# Patient Record
Sex: FEMALE | Race: OTHER | NOT HISPANIC OR LATINO | URBAN - METROPOLITAN AREA
[De-identification: names, ages, dates, MRNs, and addresses within clinical notes are randomized per-mention and may not be internally consistent; named-entity substitution may affect disease eponyms.]

---

## 2022-09-23 ENCOUNTER — EMERGENCY (EMERGENCY)
Facility: HOSPITAL | Age: 27
LOS: 1 days | Discharge: HOME | End: 2022-09-23
Attending: STUDENT IN AN ORGANIZED HEALTH CARE EDUCATION/TRAINING PROGRAM | Admitting: STUDENT IN AN ORGANIZED HEALTH CARE EDUCATION/TRAINING PROGRAM

## 2022-09-23 VITALS
OXYGEN SATURATION: 100 % | SYSTOLIC BLOOD PRESSURE: 117 MMHG | HEART RATE: 94 BPM | TEMPERATURE: 98 F | RESPIRATION RATE: 15 BRPM | DIASTOLIC BLOOD PRESSURE: 56 MMHG

## 2022-09-23 PROCEDURE — 99284 EMERGENCY DEPT VISIT MOD MDM: CPT

## 2022-09-23 NOTE — ED ADULT TRIAGE NOTE - CHIEF COMPLAINT QUOTE
awake weak c/o left flank pain sore throat and fever x 2 days up to 103 has  been vomiting and unable to eat    no PMHx  temp 98 in triage  took tylenol at 2030

## 2022-09-24 VITALS
SYSTOLIC BLOOD PRESSURE: 108 MMHG | OXYGEN SATURATION: 100 % | TEMPERATURE: 98 F | RESPIRATION RATE: 17 BRPM | HEART RATE: 68 BPM | DIASTOLIC BLOOD PRESSURE: 58 MMHG

## 2022-09-24 LAB
ALBUMIN SERPL ELPH-MCNC: 4.9 G/DL — SIGNIFICANT CHANGE UP (ref 3.3–5)
ALP SERPL-CCNC: 71 U/L — SIGNIFICANT CHANGE UP (ref 40–120)
ALT FLD-CCNC: 13 U/L — SIGNIFICANT CHANGE UP (ref 4–33)
ANION GAP SERPL CALC-SCNC: 12 MMOL/L — SIGNIFICANT CHANGE UP (ref 7–14)
APPEARANCE UR: CLEAR — SIGNIFICANT CHANGE UP
AST SERPL-CCNC: 27 U/L — SIGNIFICANT CHANGE UP (ref 4–32)
B PERT DNA SPEC QL NAA+PROBE: SIGNIFICANT CHANGE UP
B PERT+PARAPERT DNA PNL SPEC NAA+PROBE: SIGNIFICANT CHANGE UP
BACTERIA # UR AUTO: NEGATIVE — SIGNIFICANT CHANGE UP
BASOPHILS # BLD AUTO: 0.01 K/UL — SIGNIFICANT CHANGE UP (ref 0–0.2)
BASOPHILS NFR BLD AUTO: 0.3 % — SIGNIFICANT CHANGE UP (ref 0–2)
BILIRUB SERPL-MCNC: 0.7 MG/DL — SIGNIFICANT CHANGE UP (ref 0.2–1.2)
BILIRUB UR-MCNC: NEGATIVE — SIGNIFICANT CHANGE UP
BORDETELLA PARAPERTUSSIS (RAPRVP): SIGNIFICANT CHANGE UP
BUN SERPL-MCNC: 9 MG/DL — SIGNIFICANT CHANGE UP (ref 7–23)
C PNEUM DNA SPEC QL NAA+PROBE: SIGNIFICANT CHANGE UP
CALCIUM SERPL-MCNC: 8.8 MG/DL — SIGNIFICANT CHANGE UP (ref 8.4–10.5)
CHLORIDE SERPL-SCNC: 105 MMOL/L — SIGNIFICANT CHANGE UP (ref 98–107)
CO2 SERPL-SCNC: 23 MMOL/L — SIGNIFICANT CHANGE UP (ref 22–31)
COLOR SPEC: YELLOW — SIGNIFICANT CHANGE UP
CREAT SERPL-MCNC: 0.72 MG/DL — SIGNIFICANT CHANGE UP (ref 0.5–1.3)
DIFF PNL FLD: ABNORMAL
EGFR: 117 ML/MIN/1.73M2 — SIGNIFICANT CHANGE UP
EOSINOPHIL # BLD AUTO: 0 K/UL — SIGNIFICANT CHANGE UP (ref 0–0.5)
EOSINOPHIL NFR BLD AUTO: 0 % — SIGNIFICANT CHANGE UP (ref 0–6)
EPI CELLS # UR: 2 /HPF — SIGNIFICANT CHANGE UP (ref 0–5)
FLUAV SUBTYP SPEC NAA+PROBE: SIGNIFICANT CHANGE UP
FLUBV RNA SPEC QL NAA+PROBE: SIGNIFICANT CHANGE UP
GLUCOSE SERPL-MCNC: 91 MG/DL — SIGNIFICANT CHANGE UP (ref 70–99)
GLUCOSE UR QL: NEGATIVE — SIGNIFICANT CHANGE UP
HADV DNA SPEC QL NAA+PROBE: SIGNIFICANT CHANGE UP
HCG SERPL-ACNC: <5 MIU/ML — SIGNIFICANT CHANGE UP
HCOV 229E RNA SPEC QL NAA+PROBE: SIGNIFICANT CHANGE UP
HCOV HKU1 RNA SPEC QL NAA+PROBE: SIGNIFICANT CHANGE UP
HCOV NL63 RNA SPEC QL NAA+PROBE: SIGNIFICANT CHANGE UP
HCOV OC43 RNA SPEC QL NAA+PROBE: SIGNIFICANT CHANGE UP
HCT VFR BLD CALC: 41.5 % — SIGNIFICANT CHANGE UP (ref 34.5–45)
HGB BLD-MCNC: 13.4 G/DL — SIGNIFICANT CHANGE UP (ref 11.5–15.5)
HIV 1+2 AB+HIV1 P24 AG SERPL QL IA: SIGNIFICANT CHANGE UP
HMPV RNA SPEC QL NAA+PROBE: SIGNIFICANT CHANGE UP
HPIV1 RNA SPEC QL NAA+PROBE: SIGNIFICANT CHANGE UP
HPIV2 RNA SPEC QL NAA+PROBE: SIGNIFICANT CHANGE UP
HPIV3 RNA SPEC QL NAA+PROBE: SIGNIFICANT CHANGE UP
HPIV4 RNA SPEC QL NAA+PROBE: SIGNIFICANT CHANGE UP
HYALINE CASTS # UR AUTO: 1 /LPF — SIGNIFICANT CHANGE UP (ref 0–7)
IANC: 1.54 K/UL — LOW (ref 1.8–7.4)
IMM GRANULOCYTES NFR BLD AUTO: 0 % — SIGNIFICANT CHANGE UP (ref 0–0.9)
KETONES UR-MCNC: ABNORMAL
LEUKOCYTE ESTERASE UR-ACNC: NEGATIVE — SIGNIFICANT CHANGE UP
LYMPHOCYTES # BLD AUTO: 1.27 K/UL — SIGNIFICANT CHANGE UP (ref 1–3.3)
LYMPHOCYTES # BLD AUTO: 37 % — SIGNIFICANT CHANGE UP (ref 13–44)
M PNEUMO DNA SPEC QL NAA+PROBE: SIGNIFICANT CHANGE UP
MCHC RBC-ENTMCNC: 26 PG — LOW (ref 27–34)
MCHC RBC-ENTMCNC: 32.3 GM/DL — SIGNIFICANT CHANGE UP (ref 32–36)
MCV RBC AUTO: 80.4 FL — SIGNIFICANT CHANGE UP (ref 80–100)
MONOCYTES # BLD AUTO: 0.61 K/UL — SIGNIFICANT CHANGE UP (ref 0–0.9)
MONOCYTES NFR BLD AUTO: 17.8 % — HIGH (ref 2–14)
NEUTROPHILS # BLD AUTO: 1.54 K/UL — LOW (ref 1.8–7.4)
NEUTROPHILS NFR BLD AUTO: 44.9 % — SIGNIFICANT CHANGE UP (ref 43–77)
NITRITE UR-MCNC: NEGATIVE — SIGNIFICANT CHANGE UP
NRBC # BLD: 0 /100 WBCS — SIGNIFICANT CHANGE UP (ref 0–0)
NRBC # FLD: 0 K/UL — SIGNIFICANT CHANGE UP (ref 0–0)
PH UR: 6 — SIGNIFICANT CHANGE UP (ref 5–8)
PLATELET # BLD AUTO: 229 K/UL — SIGNIFICANT CHANGE UP (ref 150–400)
POTASSIUM SERPL-MCNC: 3.3 MMOL/L — LOW (ref 3.5–5.3)
POTASSIUM SERPL-SCNC: 3.3 MMOL/L — LOW (ref 3.5–5.3)
PROT SERPL-MCNC: 8.2 G/DL — SIGNIFICANT CHANGE UP (ref 6–8.3)
PROT UR-MCNC: ABNORMAL
RAPID RVP RESULT: DETECTED
RBC # BLD: 5.16 M/UL — SIGNIFICANT CHANGE UP (ref 3.8–5.2)
RBC # FLD: 14.3 % — SIGNIFICANT CHANGE UP (ref 10.3–14.5)
RBC CASTS # UR COMP ASSIST: SIGNIFICANT CHANGE UP /HPF (ref 0–4)
RSV RNA SPEC QL NAA+PROBE: SIGNIFICANT CHANGE UP
RV+EV RNA SPEC QL NAA+PROBE: SIGNIFICANT CHANGE UP
SARS-COV-2 RNA SPEC QL NAA+PROBE: DETECTED
SODIUM SERPL-SCNC: 140 MMOL/L — SIGNIFICANT CHANGE UP (ref 135–145)
SP GR SPEC: 1.03 — SIGNIFICANT CHANGE UP (ref 1.01–1.05)
UROBILINOGEN FLD QL: SIGNIFICANT CHANGE UP
WBC # BLD: 3.43 K/UL — LOW (ref 3.8–10.5)
WBC # FLD AUTO: 3.43 K/UL — LOW (ref 3.8–10.5)
WBC UR QL: 3 /HPF — SIGNIFICANT CHANGE UP (ref 0–5)

## 2022-09-24 PROCEDURE — 71045 X-RAY EXAM CHEST 1 VIEW: CPT | Mod: 26

## 2022-09-24 RX ORDER — KETOROLAC TROMETHAMINE 30 MG/ML
15 SYRINGE (ML) INJECTION ONCE
Refills: 0 | Status: DISCONTINUED | OUTPATIENT
Start: 2022-09-24 | End: 2022-09-24

## 2022-09-24 RX ORDER — ONDANSETRON 8 MG/1
4 TABLET, FILM COATED ORAL ONCE
Refills: 0 | Status: COMPLETED | OUTPATIENT
Start: 2022-09-24 | End: 2022-09-24

## 2022-09-24 RX ORDER — POTASSIUM CHLORIDE 20 MEQ
40 PACKET (EA) ORAL ONCE
Refills: 0 | Status: COMPLETED | OUTPATIENT
Start: 2022-09-24 | End: 2022-09-24

## 2022-09-24 RX ORDER — ACETAMINOPHEN 500 MG
975 TABLET ORAL ONCE
Refills: 0 | Status: DISCONTINUED | OUTPATIENT
Start: 2022-09-24 | End: 2022-09-24

## 2022-09-24 RX ORDER — IBUPROFEN 200 MG
600 TABLET ORAL ONCE
Refills: 0 | Status: DISCONTINUED | OUTPATIENT
Start: 2022-09-24 | End: 2022-09-24

## 2022-09-24 RX ORDER — SODIUM CHLORIDE 9 MG/ML
1000 INJECTION INTRAMUSCULAR; INTRAVENOUS; SUBCUTANEOUS ONCE
Refills: 0 | Status: COMPLETED | OUTPATIENT
Start: 2022-09-24 | End: 2022-09-24

## 2022-09-24 RX ORDER — DEXAMETHASONE 0.5 MG/5ML
10 ELIXIR ORAL ONCE
Refills: 0 | Status: COMPLETED | OUTPATIENT
Start: 2022-09-24 | End: 2022-09-24

## 2022-09-24 RX ADMIN — Medication 15 MILLIGRAM(S): at 03:44

## 2022-09-24 RX ADMIN — ONDANSETRON 4 MILLIGRAM(S): 8 TABLET, FILM COATED ORAL at 03:44

## 2022-09-24 RX ADMIN — Medication 10 MILLIGRAM(S): at 06:48

## 2022-09-24 RX ADMIN — Medication 40 MILLIEQUIVALENT(S): at 06:55

## 2022-09-24 RX ADMIN — Medication 15 MILLIGRAM(S): at 04:45

## 2022-09-24 RX ADMIN — SODIUM CHLORIDE 1000 MILLILITER(S): 9 INJECTION INTRAMUSCULAR; INTRAVENOUS; SUBCUTANEOUS at 03:03

## 2022-09-24 NOTE — ED ADULT NURSE NOTE - OBJECTIVE STATEMENT
received pt in room 9. A&Ox4, ambulatory at baseline, c/o throat pain, weakness, fever x3 days. States decreased PO intake x2 days, only sips water. Endorses difficulty with swallowing with numbness and tingling in head and face. No drooling noted. Airway patent. Satting 100% on room air. States took amoxicillin today from husbands prescription. no recent dental procedures, Endorses vomiting, has not eaten x2 days, spitting into bag. reports flank pain bilaterally, greater on right. mild abdominal pain. endorses burning with urination, currently on menstrual period. Denies CP, SOB, diarrhea, headache, dizziness, chills, exposure to sick, bowel/bladder changes. Vss. Rr even and unlabored. Awaiting further orders from provider.

## 2022-09-24 NOTE — ED PROVIDER NOTE - PHYSICAL EXAMINATION
Marquez PARKER:  VITALS: Initial triage and subsequent vitals have been reviewed by me.  GEN APPEARANCE: WDWN, alert and cooperative, non-toxic appearing and in NAD  HEAD: Atraumatic, normocephalic   EYES: PERRLa, EOMI, vision grossly intact.   EARS: Gross hearing intact.   NOSE: No nasal discharge, no external evidence of epistaxis.   THROAT: MMM. Oral cavity and pharynx normal. mild oropharynx inflammation, no swelling, no exudate, no oral lesions.  NECK: Supple  CV: RRR, S1S2, no c/r/m/g. No cyanosis. Extremities warm, well perfused. Cap refill <2 seconds. No bruits.   LUNGS: CTAB. No wheezing. No rales. No rhonchi. No diminished breath sounds.   ABDOMEN: Soft, NTND. No guarding or rebound. No masses.   MSK/EXT: Spine appears normal, no spine point tenderness. No CVA ttp. Normal muscular development. Pelvis stable. No obvious joint or bony deformity, no peripheral edema.   NEURO: Alert, follows commands. Weight bearing normal. Speech normal. Sensation and motor normal x4 extremities.   SKIN: Normal color for race, warm, dry and intact. No evidence of rash.  PSYCH: Normal mood and affect. Marquez PARKER:  VITALS: Initial triage and subsequent vitals have been reviewed by me.  GEN APPEARANCE: WDWN, alert and cooperative, non-toxic appearing and in NAD  HEAD: Atraumatic, normocephalic mild b/l cervical adenopathy  EYES: PERRLa, EOMI, vision grossly intact.   EARS: Gross hearing intact.   NOSE: No nasal discharge, no external evidence of epistaxis.   THROAT: MMM. Oral cavity and pharynx normal. mild oropharynx inflammation, no swelling, no exudate, no oral lesions.  NECK: Supple  CV: RRR, S1S2, no c/r/m/g. No cyanosis. Extremities warm, well perfused. Cap refill <2 seconds. No bruits.   LUNGS: CTAB. No wheezing. No rales. No rhonchi. No diminished breath sounds.   ABDOMEN: Soft, NTND. No guarding or rebound. No masses.   MSK/EXT: Spine appears normal, no spine point tenderness. No CVA ttp. Normal muscular development. Pelvis stable. No obvious joint or bony deformity, no peripheral edema.   NEURO: Alert, follows commands. Weight bearing normal. Speech normal. Sensation and motor normal x4 extremities.   SKIN: Normal color for race, warm, dry and intact. No evidence of rash.  PSYCH: Normal mood and affect.

## 2022-09-24 NOTE — ED ADULT NURSE REASSESSMENT NOTE - NS ED NURSE REASSESS COMMENT FT1
Received report from day RN. A&Ox4, ambulatory at baseline. Resting comfortably, offers no complaints. Denies CP, SOB, N+V, diarrhea, headache, dizzniness, chills. 20g IV in place to left AC, no redness or swelling noted, flushes well. Awaiting further orders from provider. Safety measures in place Received report from noel  RN. A&Ox4, ambulatory at baseline. Resting comfortably, offers no complaints. Denies CP, SOB, N+V, diarrhea, headache, dizzniness, chills. 20g IV in place to left AC, no redness or swelling noted, flushes well. Awaiting further orders from provider. Safety measures in place

## 2022-09-24 NOTE — ED PROVIDER NOTE - NSFOLLOWUPINSTRUCTIONS_ED_ALL_ED_FT
(1) Follow up with your primary care physician as discussed.  (2) Immediately seek care at your nearest emergency room if your symptoms worsen, persist, or do not resolve   (3) Take Tylenol and/or Motrin as needed for pain per the dosing instructions on the bottle.            COVID-19 (Enfermedad por coronavirus 2019)    LO QUE NECESITA SABER:    La COVID-19 es la enfermedad causada por el nuevo coronavirus descubierto por primera vez en diciembre de 2019. Los coronavirus generalmente causan infecciones de las vías respiratorias superiores (nariz, garganta y pulmones), vane un resfriado. El virus de 2019 se propaga rápida y fácilmente. Puede transmitirse a partir de 2 a 3 días antes de que comiencen los síntomas.    INSTRUCCIONES SOBRE EL CONNIE HOSPITALARIA:    Llame al número de emergencias local (911 en los [place]) si:  •Usted tiene dificultad para respirar o falta de aliento mientras descansa.      •Usted siente presión o dolor en el pecho que dura más de 5 minutos.      •Usted tiene confusión o es difícil despertarlo.      •Marisa labios o christian están azules.      Regrese a la jevon de emergencias si:  •Usted tiene fiebre de 104 °F (40 °C) o más.          Llame a rodrigues médico si:  •Usted tiene síntomas de COVID-19.      •Usted tiene preguntas o inquietudes acerca de rodrigues condición o cuidado.      Medicamentos:Es posible que usted necesite alguno de los siguientes:  •Los descongestivosayudan a reducir la congestión nasal y ayudan a respirar más fácilmente. Si nazia pastillas descongestivas, pueden causarle agitación o problemas para dormir. No use aerosol descongestionante por más de unos cuantos días.      •Los jarabes para la tosayudan a reducir la tos. Pregúntele a rodrigues médico cuál tipo de medicamento para la tos es mejor para usted.      •Acetaminofénalivia el dolor y baja la fiebre. Está disponible sin receta médica. Pregunte la cantidad y la frecuencia con que debe tomarlos. Siga las indicaciones. Lara las etiquetas de todos los demás medicamentos que esté usando para saber si también contienen acetaminofén, o pregunte a rodrigues médico o farmacéutico. El acetaminofén puede causar daño en el hígado cuando no se nazia de forma correcta.      •AINEcomo el ibuprofeno, ayudan a disminuir la inflamación, el dolor y la fiebre. Natalie medicamento está disponible con o sin sharath receta médica. Los CARINA pueden causar sangrado estomacal o problemas renales en ciertas personas. Si usted nazia un medicamento anticoagulante, siempre pregúntele a rodrigues médico si los CARINA son seguros para usted. Siempre lara la etiqueta de natalie medicamento y siga las instrucciones.      •Los anticoagulantes ayudan a evitar los coágulos sanguíneos. Los coágulos pueden ocasionar derrames cerebrales, ataques al corazón y hasta la muerte. Las siguientes son pautas generales de seguridad para seguir mientras está tomando un anticoagulante:?Esté atento a sangrados y hematomas mientras esté tomando anticoagulantes. Esté atento a cualquier sangrado de las encías o nariz. Esté atento a la aparición de tariq en rodrigues orina y evacuaciones intestinales. Use sharath toalla suave para rodrigues piel y un cepillo de dientes de cerdas suaves para cepillarse marisa dientes. [place] puede evitar que rodrigues piel o encías sangren. Si usted se afeita, use sharath rasuradora eléctrica. No practique deportes de contacto.      ?Informe a rodrigues odontólogo y otros médicos que usted nazia anticoagulantes. Lleve un brazalete o un collar que indique que usted nazia natalie medicamento.      ?No empiece ni suspenda ningún medicamento, a menos que rodrigues médico se lo indique. Muchos medicamentos no se pueden usar junto con los anticoagulantes.      ?[place] el anticoagulante exactamente vane se lo ordenó rodrigues médico. No omita ninguna dosis ni tome menos de lo indicado. Informe a rodrigues médico inmediatamente si usted olvida annette el anticoagulante o si nazia de más.      ?La warfarina es un anticoagulante que usted puede necesitar annette. Usted debería saber lo siguiente en sav de que tome warfarina: ?Algunos alimentos y medicamentos pueden afectar la cantidad de warfarina en rodrigues tariq. No realice cambios mayores en rodrigues alimentación mientras nazia warfarina. La warfarina funciona mejor si usted ingiere aproximadamente la misma cantidad de vitamina K todos los días. La vitamina K se encuentra en las hortalizas de hoja jasvir y algunos otros alimentos. Solicite más información acerca de lo que tiene que comer cuando usted nazia warfarina.      ?Usted necesitará acudir a consultas de control con rodrigues médico cuando esté tomando warfarina. Deberá hacerse análisis de tariq periódicamente. Estos análisis se usan para determinar la cantidad de medicamento que necesita.        •[place] marisa medicamentos vane se le haya indicado.Consulte con rodrigues médico si usted chet que rodrigues medicamento no le está ayudando o si presenta efectos secundarios. Infórmele al médico si usted es alérgico a algún medicamento. Mantenga sharath lista actualizada de los medicamentos, las vitaminas y los productos herbales que nazia. Incluya los siguientes datos de los medicamentos: cantidad, frecuencia y motivo de administración. Traiga con usted la lista o los envases de las píldoras a marisa citas de seguimiento. Lleve la lista de los medicamentos con usted en sav de sharath emergencia.      Lo que necesita saber acerca de las variantes:El virus ha cambiado en varias formas nuevas (llamadas variantes) desde que se descubrió. Las variantes pueden ser más contagiosas (se propagan fácilmente) que la forma original. Además, algunas pueden causar sharath enfermedad más grave que otras.    Lo que necesita saber acerca de las vacunas contra la COVID-19:Los médicos recomiendan la vacuna contra la COVID-19, incluso si ya tuvo COVID-19. Se considera que usted está completamente vacunado contra la COVID-19 dos semanas después de la última dosis de cualquier vacuna contra COVID-19. Informe a rodrigues médico cuando haya recibido la última dosis de la vacuna. Tj sharath copia de rodrigues tarjeta de vacunación. Conserve el original en sva de que tenga que mostrarlo. Mantenga la copia en un lugar seguro.  •Reciba la vacuna contra la COVID-19 según las indicaciones.Se recomienda la vacunación a todas las personas de 6 meses o más. Las vacunas contra la COVID-19 se administran en forma de inyección en 1 a 3 dosis primarias. [place] depende de la angie de la vacuna y de la edad de la persona que la recibe. Se recomienda sharath dosis de refuerzo para todas las personas de 5 años o más después de completar la serie primaria. Se recomienda sharath segunda dosis refuerzo para todos los adultos de 50 años o más y para los adolescentes inmunodeprimidos. La segunda dosis de refuerzo también se recomienda para todos los que recibieron la angie de la vacuna de 1 dosis vane la primera dosis y un refuerzo. Rodrigues médico puede darle más información sobre los refuerzos y ayudarlo a programar todas las dosis necesarias.  Calendario de vacunación recomendado contra la COVID-19           •Continúe con el distanciamiento social y otras medidas.Puede contagiarse incluso después de recibir la vacuna. También puede transmitir el virus a otras personas sin saber que tiene la infección.      •Después de vacunarse, compruebe las normas de viaje locales, nacionales e internacionales.Es posible que tenga que hacerse la prueba antes de viajar. Algunos países exigen evidencia de que la prueba es negativa antes de viajar. Alexis vez también sea necesario hacer cuarentena tras rodrigues regreso.      •Se pueden administrar medicamentos para evitar sharath infección.Los medicamentos pueden administrarse si tiene un alto riesgo de infección y no puede recibir la vacuna. También pueden administrarse si rodrigues sistema inmunitario no responde bailey a la vacuna.      Cómo se propaga el coronavirus 2019:  •Las gotitas son la principal forma de propagación de todos los coronavirus.El virus viaja en las gotitas que se marcela cuando sharath persona habla, canta, tose o estornuda. Las gotitas también pueden flotar en el aire por minutos u horas. La infección se produce cuando respira las gotitas o cuando le tocan los ojos o la nariz. El contacto personal cercano con sharath persona infectada aumenta el riesgo de infección. [place] significa estar a menos de 6 pies (2 metros) de distancia de la persona josefina al menos 15 minutos en 24 horas.      •El contacto de persona a persona puede propagar el virus.Por ejemplo, sharath persona con el virus en marisa eleazar puede propagarlo al darle la mano a alguien.      •El virus puede permanecer en objetos y superficies hasta 3 días.Puede infectarse si toca el objeto o la superficie y luego se toca los ojos o la boca.      Ayude a reducir el riesgo de COVID-19:  •Lávese las eleazar con frecuencia josefina el día.Utilice agua y jabón. Frótese las eleazar enjabonadas, entrelazando los dedos, josefina al menos 20 segundos. Enjuáguese con agua corriente caliente. Séquese las eleazar con sharath toalla limpia o sharath toalla de papel. Puede usar un desinfectante para eleazar que contenga alcohol, si no hay agua y jabón disponibles. Enseñe a los niños a lavarse las eleazar y a usar el desinfectante de eleazar.  Lavado de eleazar           •Cúbrase al toser y estornudar.Gire la christian y cúbrase la boca y la nariz con un pañuelo. Deseche el pañuelo. Use el ángulo del brazo si no tiene un pañuelo disponible. Luego lávese las eleazar con agua y jabón o use un desinfectante de eleazar. Enséñeles a los niños a cubrirse al toser o estornudar.      •Use un tapabocas (máscara) cuando sea necesario.Utilice un tapabocas de kelton con al menos 2 capas. También puede crear capas colocando un tapabocas de kelton sobre sharath máscara no médica desechable. Cúbrase la boca y la nariz.  Cómo usar un tapabocas (mascarilla)           •Siga las pautas de distanciamiento social a nivel local, nacional y mundial.Mantenga al menos 6 pies (2 metros) de distancia entre usted y los demás.      •Trate de no tocarse la christian.Si tiene el virus en las eleazar, puede transferirlo a los ojos, la nariz o la boca e infectarse. También puede transferirlo a los objetos, las superficies o las personas.      •Limpie y desinfecte a menudo los objetos y las superficies de alto contacto.Use toallitas húmedas desinfectantes o tj sharath solución de 4 cucharaditas de lejía en 1 cuarto de galón (4 tazas) de agua.      •Pregunte sobre otras vacunas que usted puede necesitar.Debe recibir la vacuna contra la influenza (gripe) tan pronto vane se lo recomienden cada año, generalmente en septiembre u octubre. Vacúnese contra la neumonía si se recomienda. Rodrigues médico puede indicarle si también debe recibir otras vacunas, y cuándo aplicárselas.    Prevenga la infección por COVID-19         Siga las pautas de distanciamiento social:Las normas de distanciamiento social nacionales y locales varían. Las reglas y restricciones pueden cambiar con el tiempo a medida que se levantan las restricciones. Las siguientes son reglas generales al respecto:  •Quédese en casa si está enfermo o chet que puede tener COVID-19.Es importante que se quede en casa si está esperando sharath eber para sharath prueba o los resultados de sharath prueba.      •Evite el contacto físico cercano con nadie que no viva en rodrigues casa.No le dé la mano, abrace o bese a sharath persona vane saludo. Si tiene que usar el transporte público (vane el autobús o el metro), intente sentarse o pararse lejos de los demás. Use el tapabocas.      •Evite las reuniones en persona y las multitudes.Asista virtualmente, si es posible.      Acuda a la consulta de control con rodrigues médico según las indicaciones:Anote marisa preguntas para que se acuerde de hacerlas josefina marisa visitas.    Para más información:  •Centers for Disease Control and Prevention  [address] [place] [id number]  Phone: [phone]  Web Address: http://www.cdc.gov

## 2022-09-24 NOTE — ED PROVIDER NOTE - PATIENT PORTAL LINK FT
You can access the FollowMyHealth Patient Portal offered by [facility] by registering at the following website: [email or web address]. By joining [employer]’s FollowMyHealth portal, you will also be able to view your health information using other applications (apps) compatible with our system.

## 2022-09-24 NOTE — ED PROVIDER NOTE - CLINICAL SUMMARY MEDICAL DECISION MAKING FREE TEXT BOX
O'Kori DO PGY-3: pt p/w sore throat, fever, body aches, joint aches. Has not been covid vaccinated. Tolerating secretions but does have pain w/ swallowing food. Airway is patent. Probable viral syndrome. ordered labs, meds, cxr. Will reassess. Dispo pending workup.

## 2022-09-24 NOTE — ED PROVIDER NOTE - PROGRESS NOTE DETAILS
O'Kori DO PGY-3: pt passed PO chall. Will dc. Discussed return precautions O'Chisago DO PGY-3: pt passed PO chall. Will dc. Discussed return precautions via  Gema [id number]. No nausea on reassess or abd ttp.

## 2022-09-24 NOTE — ED PROVIDER NOTE - OBJECTIVE STATEMENT
Marquez PARKER: 26F hx of -- presents with a cc of fever x3 days a/w sore throat a/w difficulty with swallowing, also feeling numbness and tingling in head and face, taking amoxicillin x1 day, no recent dental procedures, feels as if is having difficulty swallowing liquids. Endorses vomiting, has not eaten x2 days. reports is having "pain where lungs are", mild abdominal pain, no recent travel, no sick contacts, endorses burning with urination. of all symptoms she is most concerned about the throat discomfort. fevers x3 days.     Ug[place]an interp [id number] Marquez PARKER: 26F no pmh presents with a cc of fever x3 days a/w sore throat a/w difficulty with swallowing, also feeling numbness and tingling in head and face, taking amoxicillin x1 day, no recent dental procedures, feels as if is having difficulty swallowing liquids. Endorses vomiting, has not eaten x2 days. reports is having "pain where lungs are", mild abdominal pain, no recent travel, no sick contacts, endorses burning with urination. of all symptoms she is most concerned about the throat discomfort. fevers x3 days. notes diffuse body aches, not vaccinated for covid.    [place]gu[place]an interp [id number]

## 2022-09-24 NOTE — ED PROVIDER NOTE - NSFOLLOWUPCLINICS_GEN_ALL_ED_FT
[facility] General Internal Medicine  General Internal Medicine  [address] [id number]  Phone: [phone]  Fax:   Follow Up Time: 4-6 Days

## 2022-09-25 LAB
CULTURE RESULTS: SIGNIFICANT CHANGE UP
SPECIMEN SOURCE: SIGNIFICANT CHANGE UP

## 2024-02-14 ENCOUNTER — EMERGENCY (EMERGENCY)
Facility: HOSPITAL | Age: 29
LOS: 1 days | Discharge: HOME | End: 2024-02-14
Admitting: EMERGENCY MEDICINE
Payer: MEDICAID

## 2024-02-14 VITALS
HEART RATE: 75 BPM | SYSTOLIC BLOOD PRESSURE: 106 MMHG | DIASTOLIC BLOOD PRESSURE: 76 MMHG | OXYGEN SATURATION: 98 % | TEMPERATURE: 99 F | RESPIRATION RATE: 16 BRPM

## 2024-02-14 LAB
ALBUMIN SERPL ELPH-MCNC: 3.9 G/DL — SIGNIFICANT CHANGE UP (ref 3.3–5)
ALP SERPL-CCNC: 83 U/L — SIGNIFICANT CHANGE UP (ref 40–120)
ALT FLD-CCNC: 33 U/L — SIGNIFICANT CHANGE UP (ref 4–33)
ANION GAP SERPL CALC-SCNC: 9 MMOL/L — SIGNIFICANT CHANGE UP (ref 7–14)
AST SERPL-CCNC: 31 U/L — SIGNIFICANT CHANGE UP (ref 4–32)
BASOPHILS # BLD AUTO: 0.03 K/UL — SIGNIFICANT CHANGE UP (ref 0–0.2)
BASOPHILS NFR BLD AUTO: 0.5 % — SIGNIFICANT CHANGE UP (ref 0–2)
BILIRUB SERPL-MCNC: 0.6 MG/DL — SIGNIFICANT CHANGE UP (ref 0.2–1.2)
BUN SERPL-MCNC: 9 MG/DL — SIGNIFICANT CHANGE UP (ref 7–23)
CALCIUM SERPL-MCNC: 8.8 MG/DL — SIGNIFICANT CHANGE UP (ref 8.4–10.5)
CHLORIDE SERPL-SCNC: 106 MMOL/L — SIGNIFICANT CHANGE UP (ref 98–107)
CO2 SERPL-SCNC: 27 MMOL/L — SIGNIFICANT CHANGE UP (ref 22–31)
CREAT SERPL-MCNC: 0.71 MG/DL — SIGNIFICANT CHANGE UP (ref 0.5–1.3)
EGFR: 118 ML/MIN/1.73M2 — SIGNIFICANT CHANGE UP
EOSINOPHIL # BLD AUTO: 0.07 K/UL — SIGNIFICANT CHANGE UP (ref 0–0.5)
EOSINOPHIL NFR BLD AUTO: 1.2 % — SIGNIFICANT CHANGE UP (ref 0–6)
GLUCOSE SERPL-MCNC: 102 MG/DL — HIGH (ref 70–99)
HCG SERPL-ACNC: <1 MIU/ML — SIGNIFICANT CHANGE UP
HCT VFR BLD CALC: 35.3 % — SIGNIFICANT CHANGE UP (ref 34.5–45)
HGB BLD-MCNC: 11.4 G/DL — LOW (ref 11.5–15.5)
IANC: 3.08 K/UL — SIGNIFICANT CHANGE UP (ref 1.8–7.4)
IMM GRANULOCYTES NFR BLD AUTO: 0.2 % — SIGNIFICANT CHANGE UP (ref 0–0.9)
LYMPHOCYTES # BLD AUTO: 1.79 K/UL — SIGNIFICANT CHANGE UP (ref 1–3.3)
LYMPHOCYTES # BLD AUTO: 31.7 % — SIGNIFICANT CHANGE UP (ref 13–44)
MCHC RBC-ENTMCNC: 25.9 PG — LOW (ref 27–34)
MCHC RBC-ENTMCNC: 32.3 GM/DL — SIGNIFICANT CHANGE UP (ref 32–36)
MCV RBC AUTO: 80.2 FL — SIGNIFICANT CHANGE UP (ref 80–100)
MONOCYTES # BLD AUTO: 0.67 K/UL — SIGNIFICANT CHANGE UP (ref 0–0.9)
MONOCYTES NFR BLD AUTO: 11.9 % — SIGNIFICANT CHANGE UP (ref 2–14)
NEUTROPHILS # BLD AUTO: 3.08 K/UL — SIGNIFICANT CHANGE UP (ref 1.8–7.4)
NEUTROPHILS NFR BLD AUTO: 54.5 % — SIGNIFICANT CHANGE UP (ref 43–77)
NRBC # BLD: 0 /100 WBCS — SIGNIFICANT CHANGE UP (ref 0–0)
NRBC # FLD: 0 K/UL — SIGNIFICANT CHANGE UP (ref 0–0)
PLATELET # BLD AUTO: 280 K/UL — SIGNIFICANT CHANGE UP (ref 150–400)
POTASSIUM SERPL-MCNC: 3.7 MMOL/L — SIGNIFICANT CHANGE UP (ref 3.5–5.3)
POTASSIUM SERPL-SCNC: 3.7 MMOL/L — SIGNIFICANT CHANGE UP (ref 3.5–5.3)
PROT SERPL-MCNC: 7.1 G/DL — SIGNIFICANT CHANGE UP (ref 6–8.3)
RBC # BLD: 4.4 M/UL — SIGNIFICANT CHANGE UP (ref 3.8–5.2)
RBC # FLD: 17.7 % — HIGH (ref 10.3–14.5)
SODIUM SERPL-SCNC: 142 MMOL/L — SIGNIFICANT CHANGE UP (ref 135–145)
WBC # BLD: 5.65 K/UL — SIGNIFICANT CHANGE UP (ref 3.8–10.5)
WBC # FLD AUTO: 5.65 K/UL — SIGNIFICANT CHANGE UP (ref 3.8–10.5)

## 2024-02-14 PROCEDURE — 99285 EMERGENCY DEPT VISIT HI MDM: CPT

## 2024-02-14 PROCEDURE — 74177 CT ABD & PELVIS W/CONTRAST: CPT | Mod: 26,MA

## 2024-02-14 RX ORDER — KETOROLAC TROMETHAMINE 30 MG/ML
15 SYRINGE (ML) INJECTION ONCE
Refills: 0 | Status: COMPLETED | OUTPATIENT
Start: 2024-02-14 | End: 2024-02-14

## 2024-02-14 NOTE — ED PROVIDER NOTE - CLINICAL SUMMARY MEDICAL DECISION MAKING FREE TEXT BOX
Pt is a 27 YO F with no significant PMH who presented to ED with acute on chronic abdominal pain. Pt reports having "bulge" in her R lower stomach x 4 years, reports started bothering her over past 5 days. No overlying skin changes or appreciable hernia on exam. Plan for labs and CT scan will reassess. Quinolones Counseling:  I discussed with the patient the risks of fluoroquinolones including but not limited to GI upset, allergic reaction, drug rash, diarrhea, dizziness, photosensitivity, yeast infections, liver function test abnormalities, tendonitis/tendon rupture.

## 2024-02-14 NOTE — ED PROVIDER NOTE - PROGRESS NOTE DETAILS
ARABELLA Otero: pt reassessed, reports feeling well, no acute findings on CT imaging, UA or blood work. Will provide pt with GI follow up and strict return precautions.

## 2024-02-14 NOTE — ED PROVIDER NOTE - ABDOMINAL EXAM
abdomen soft, non distended, + pain to palpation RLQ, no appreciable hernia, no overlying skin changes

## 2024-02-14 NOTE — ED PROVIDER NOTE - PATIENT PORTAL LINK FT
You can access the FollowMyHealth Patient Portal offered by [facility] by registering at the following website: [email or web address]. By joining [employer]’s FollowMyHealth portal, you will also be able to view your health information using other applications (apps) compatible with our system.

## 2024-02-14 NOTE — ED ADULT NURSE NOTE - OBJECTIVE STATEMENT
Patient received in intake room 2. Patient A&Ox3 and ambulatory at baseline. Patient presents to the ED c/o pelvic pain x 4 days. Patient denies significant phx. Patient denies headache, lightheadedness, dizziness, nausea/vomiting, fever/chills, and pain. Patient offers no complaints at this time. Respirations even and unlabored, no signs/symptoms of acute distress; patient denies dyspnea, shortness of breath, and chest pain. Patient is stable at this time. 20G IV placed to left AC, labs collected and sent.

## 2024-02-14 NOTE — ED PROVIDER NOTE - OBJECTIVE STATEMENT
Pt is a 27 YO F with no significant PMH who presented to ED with abdominal pain. Pt reports she noticed a "bulge" to her right lower stomach after giving birth ~ 4 years ago. Pt reports the bulge was not painful until ~ 5 days ago. Pt reports pain comes and goes. Denies any skin changes. Reports she is eating and drinking normally. Reports she is having normal bowel movements. Denies fevers, chills, chest pain, nausea, vomiting, diarrhea, dysuria.      used ID# [id number]

## 2024-02-15 LAB
APPEARANCE UR: CLEAR — SIGNIFICANT CHANGE UP
BILIRUB UR-MCNC: NEGATIVE — SIGNIFICANT CHANGE UP
COLOR SPEC: YELLOW — SIGNIFICANT CHANGE UP
DIFF PNL FLD: NEGATIVE — SIGNIFICANT CHANGE UP
GLUCOSE UR QL: NEGATIVE MG/DL — SIGNIFICANT CHANGE UP
KETONES UR-MCNC: NEGATIVE MG/DL — SIGNIFICANT CHANGE UP
LEUKOCYTE ESTERASE UR-ACNC: NEGATIVE — SIGNIFICANT CHANGE UP
NITRITE UR-MCNC: NEGATIVE — SIGNIFICANT CHANGE UP
PH UR: 7.5 — SIGNIFICANT CHANGE UP (ref 5–8)
PROT UR-MCNC: NEGATIVE MG/DL — SIGNIFICANT CHANGE UP
SP GR SPEC: 1.03 — SIGNIFICANT CHANGE UP (ref 1–1.03)
UROBILINOGEN FLD QL: 0.2 MG/DL — SIGNIFICANT CHANGE UP (ref 0.2–1)

## 2024-02-16 LAB
CULTURE RESULTS: SIGNIFICANT CHANGE UP
SPECIMEN SOURCE: SIGNIFICANT CHANGE UP